# Patient Record
Sex: FEMALE | Race: WHITE | NOT HISPANIC OR LATINO | Employment: STUDENT | URBAN - METROPOLITAN AREA
[De-identification: names, ages, dates, MRNs, and addresses within clinical notes are randomized per-mention and may not be internally consistent; named-entity substitution may affect disease eponyms.]

---

## 2022-08-21 ENCOUNTER — HOSPITAL ENCOUNTER (EMERGENCY)
Facility: HOSPITAL | Age: 22
Discharge: HOME | End: 2022-08-21
Attending: EMERGENCY MEDICINE

## 2022-08-21 VITALS
TEMPERATURE: 98 F | HEART RATE: 77 BPM | RESPIRATION RATE: 16 BRPM | SYSTOLIC BLOOD PRESSURE: 129 MMHG | DIASTOLIC BLOOD PRESSURE: 86 MMHG | OXYGEN SATURATION: 97 %

## 2022-08-21 DIAGNOSIS — F10.920 ALCOHOLIC INTOXICATION WITHOUT COMPLICATION: Primary | ICD-10-CM

## 2022-08-21 PROCEDURE — 99282 PR EMERGENCY DEPT VISIT,LEVEL II: ICD-10-PCS | Mod: ,,, | Performed by: EMERGENCY MEDICINE

## 2022-08-21 PROCEDURE — 99283 EMERGENCY DEPT VISIT LOW MDM: CPT

## 2022-08-21 PROCEDURE — 99282 EMERGENCY DEPT VISIT SF MDM: CPT | Mod: ,,, | Performed by: EMERGENCY MEDICINE

## 2022-08-21 NOTE — ED NOTES
Pt. dc'd home dc information reviewed with Pt. Verbalized understanding Pt. Ambulated to lobby waiting on ride

## 2022-08-21 NOTE — ED PROVIDER NOTES
"Encounter Date: 8/21/2022       History     Chief Complaint   Patient presents with    Alcohol Intoxication     Patient brought in by EMS for evaluation of alcohol intoxication. Patient brought from [facility].      Michelle Langford is a 21 y.o. year old female with no significant PMH presenting to [facility] via EMS for evaluation of alcohol intoxication.     Per EMS, the patient was brought from her dorm at [facility] after they were called out of concern about her alcohol intoxication. Per patient, she was "thrown onto a stretcher for no reason" while standing on the street outside a bar called "[employer]". Patient asking for phone, but did not have it with her on arrival. She denies any headache, head injury, neck pain, back pain, chest pain, dyspnea, N/V/D, abdominal pain, dysuria, confusion, weakness, or sensory changes.         Review of patient's allergies indicates:  No Known Allergies  No past medical history on file.  No past surgical history on file.  No family history on file.     Review of Systems   Constitutional: Negative for chills and fever.   HENT: Negative for congestion and sore throat.    Eyes: Negative for pain and redness.   Respiratory: Negative for chest tightness and shortness of breath.    Cardiovascular: Negative for chest pain and leg swelling.   Gastrointestinal: Negative for abdominal distention, abdominal pain, diarrhea, nausea and vomiting.   Genitourinary: Negative for dysuria, flank pain and hematuria.   Musculoskeletal: Negative for back pain and neck pain.   Skin: Negative for color change and rash.   Neurological: Negative for dizziness, speech difficulty, light-headedness and headaches.   Psychiatric/Behavioral:        +etoh intoxication        Physical Exam     Initial Vitals [08/21/22 0156]   BP Pulse Resp Temp SpO2   129/86 77 16 98.2 °F (36.8 °C) 97 %      MAP       --         Physical Exam    Nursing note and vitals reviewed.  Constitutional: She appears well-developed " and well-nourished.   Patient smells of alcohol and answers questions loudly, but is easily redirectable and answers questions appropriately    HENT:   Head: Normocephalic.   Right Ear: External ear normal.   Left Ear: External ear normal.   Eyes: Conjunctivae are normal. Pupils are equal, round, and reactive to light. No scleral icterus.   Neck: Neck supple.   Cardiovascular: Normal rate and regular rhythm.   Pulmonary/Chest: Breath sounds normal. No stridor. No respiratory distress. She has no wheezes.   Abdominal: Abdomen is soft. There is no abdominal tenderness. There is no rebound and no guarding.   Musculoskeletal:         General: No edema.      Cervical back: Neck supple.     Neurological: She is alert and oriented to person, place, and time. GCS score is 15. GCS eye subscore is 4. GCS verbal subscore is 5. GCS motor subscore is 6.   Skin: Skin is warm and dry. Capillary refill takes less than 2 seconds. No rash noted.         ED Course   Procedures  Labs Reviewed   HIV 1 / 2 ANTIBODY   HEPATITIS C ANTIBODY          Imaging Results    None          Medications - No data to display  Medical Decision Making:   History:   I obtained history from: EMS provider.  ED Management:  Patient was brought to the ED via EMS after they were called for concern that the patient was overly intoxicated with alcohol. Patient was afebrile and hemodynamically stable upon arrival.     Patient seemed moderately intoxicated on initial assessment and confused about prior events in the evening, but improved with rest and food. Her father contacted the ED and spoke with Dr. Gonzales about the patient's assessment and plan. Patient later reassessed and clinical sobriety was achieved. Patient had no physical complaints. Patient is able to be picked up from the ED and taken to her dorm at [facility]. Patient has decisional capacity to leave and will be discharged home.             Attending Attestation:   Physician Attestation Statement for  Resident:  As the supervising MD   Physician Attestation Statement: I have personally seen and examined this patient.   I agree with the above history. -:   As the supervising MD I agree with the above PE.    As the supervising MD I agree with the above treatment, course, plan, and disposition.   -:   21-year-old female presenting to emergency department complaint of alcohol intoxication.  Patient states that she drink 2 shots this evening.  She does not know what prompted a friend or a bystander to call EMS.  She is afebrile, stable, nontoxic without external evidence of trauma.  She is ambulating independently with clear speech.  She is tolerating p.o..  She is able to articulate a plan for getting into her dorm room which involves her roommate being home, not intoxicated, and able to let her in at this time of morning.  She does not want to stay in the emergency department for further evaluation.  She is clinically sober, and I cannot keep her here against her will.  She declines further workup.  Discharged in stable condition.  [facility] police to bring her back to her dorm.  I have reviewed the following: old records at this facility.                         Clinical Impression:   Final diagnoses:  [F10.920] Alcoholic intoxication without complication (Primary)          ED Disposition Condition    Discharge Stable        ED Prescriptions     None        Follow-up Information     Follow up With Specialties Details Why Contact Info    Your primar care doctor  Schedule an appointment as soon as possible for a visit              Keren Mandujano MD  Resident  08/21/22 6303       Kim Gonzales MD  08/21/22 4331

## 2023-05-31 ENCOUNTER — OFFICE VISIT (OUTPATIENT)
Dept: URGENT CARE | Facility: CLINIC | Age: 23
End: 2023-05-31
Payer: COMMERCIAL

## 2023-05-31 VITALS
WEIGHT: 135 LBS | BODY MASS INDEX: 21.19 KG/M2 | TEMPERATURE: 98 F | SYSTOLIC BLOOD PRESSURE: 106 MMHG | RESPIRATION RATE: 16 BRPM | DIASTOLIC BLOOD PRESSURE: 61 MMHG | HEART RATE: 77 BPM | OXYGEN SATURATION: 97 % | HEIGHT: 67 IN

## 2023-05-31 DIAGNOSIS — E86.0 DEHYDRATION: ICD-10-CM

## 2023-05-31 DIAGNOSIS — R11.2 NAUSEA AND VOMITING, UNSPECIFIED VOMITING TYPE: Primary | ICD-10-CM

## 2023-05-31 PROBLEM — K58.9 IRRITABLE BOWEL SYNDROME: Status: ACTIVE | Noted: 2020-12-29

## 2023-05-31 PROBLEM — F41.1 GENERALIZED ANXIETY DISORDER: Status: ACTIVE | Noted: 2022-07-22

## 2023-05-31 PROBLEM — G89.29 CHRONIC BILATERAL LOW BACK PAIN WITHOUT SCIATICA: Status: ACTIVE | Noted: 2021-01-19

## 2023-05-31 PROBLEM — F41.9 ANXIETY: Status: ACTIVE | Noted: 2023-05-31

## 2023-05-31 PROBLEM — F32.9 MAJOR DEPRESSIVE DISORDER WITH CURRENT ACTIVE EPISODE: Status: ACTIVE | Noted: 2020-12-29

## 2023-05-31 PROBLEM — M54.50 CHRONIC BILATERAL LOW BACK PAIN WITHOUT SCIATICA: Status: ACTIVE | Noted: 2021-01-19

## 2023-05-31 PROCEDURE — S0119 ONDANSETRON 4 MG: HCPCS | Mod: S$GLB,,, | Performed by: NURSE PRACTITIONER

## 2023-05-31 PROCEDURE — S0119 PR ONDANSETRON, ORAL, 4MG: ICD-10-PCS | Mod: S$GLB,,, | Performed by: NURSE PRACTITIONER

## 2023-05-31 PROCEDURE — 99204 OFFICE O/P NEW MOD 45 MIN: CPT | Mod: S$GLB,,, | Performed by: NURSE PRACTITIONER

## 2023-05-31 PROCEDURE — 99204 PR OFFICE/OUTPT VISIT, NEW, LEVL IV, 45-59 MIN: ICD-10-PCS | Mod: S$GLB,,, | Performed by: NURSE PRACTITIONER

## 2023-05-31 RX ORDER — GABAPENTIN 300 MG/1
300 CAPSULE ORAL 3 TIMES DAILY
COMMUNITY
Start: 2023-03-31

## 2023-05-31 RX ORDER — ONDANSETRON 4 MG/1
4 TABLET, ORALLY DISINTEGRATING ORAL
Status: COMPLETED | OUTPATIENT
Start: 2023-05-31 | End: 2023-05-31

## 2023-05-31 RX ORDER — ONDANSETRON 4 MG/1
4 TABLET, ORALLY DISINTEGRATING ORAL 2 TIMES DAILY PRN
Qty: 4 TABLET | Refills: 0 | Status: SHIPPED | OUTPATIENT
Start: 2023-05-31 | End: 2023-06-02

## 2023-05-31 RX ORDER — FLUOXETINE 20 MG/1
TABLET ORAL
COMMUNITY
Start: 2022-07-22

## 2023-05-31 RX ORDER — SODIUM CHLORIDE 9 MG/ML
INJECTION, SOLUTION INTRAVENOUS
Status: COMPLETED | OUTPATIENT
Start: 2023-05-31 | End: 2023-05-31

## 2023-05-31 RX ADMIN — ONDANSETRON 4 MG: 4 TABLET, ORALLY DISINTEGRATING ORAL at 05:05

## 2023-05-31 RX ADMIN — SODIUM CHLORIDE: 9 INJECTION, SOLUTION INTRAVENOUS at 05:05

## 2023-05-31 NOTE — PROGRESS NOTES
"Subjective:      Patient ID: Michelle Langford is a 22 y.o. female.    Vitals:  height is 5' 7" (1.702 m) and weight is 61.2 kg (135 lb). Her temperature is 98.2 °F (36.8 °C). Her blood pressure is 106/61 and her pulse is 77. Her respiration is 16 and oxygen saturation is 97%.     Chief Complaint: Emesis    22 y.o female presents today c/o nauseas and emesis that started 3 hours ago and [place] been vomiting every 20-30 minutes since then.  Patient also feels like passing out.       22-year-old returned from [place] last night after visiting for 2 weeks, consumed eggs and toast this morning and approx. 4 hours later she had episodes of vomiting and nausea.     Emesis   This is a new problem. The current episode started today. The problem occurs 5 to 10 times per day. The emesis has an appearance of stomach contents. There has been no fever. Associated symptoms include abdominal pain, chills and headaches. Pertinent negatives include no arthralgias, chest pain, coughing, decreased urine volume, diarrhea, dizziness, fever, myalgias, sweats, URI or weight loss. She has tried nothing for the symptoms.     Constitution: Positive for chills, fatigue and generalized weakness. Negative for fever.   Cardiovascular:  Negative for chest pain.   Respiratory:  Negative for cough.    Gastrointestinal:  Positive for abdominal pain and vomiting. Negative for diarrhea.   Genitourinary:  Negative for urine decreased.   Musculoskeletal:  Negative for joint pain and muscle ache.   Neurological:  Positive for headaches. Negative for dizziness.    Objective:     Physical Exam   Constitutional: She is oriented to person, place, and time. She appears well-developed. She is cooperative. She appears ill. awake  HENT:   Head: Normocephalic and atraumatic.   Ears:   Right Ear: External ear normal.   Left Ear: External ear normal.   Nose: Nose normal.   Mouth/Throat: Mucous membranes are dry.   Eyes: Conjunctivae and lids are normal.   Neck: Trachea " normal. Neck supple.   Cardiovascular: Normal rate, regular rhythm and normal heart sounds.   Pulmonary/Chest: Effort normal and breath sounds normal. No respiratory distress.   Abdominal: Bowel sounds are normal. She exhibits no distension and no mass. Soft. There is no abdominal tenderness.   Musculoskeletal: Normal range of motion.         General: Normal range of motion.   Neurological: She is alert and oriented to person, place, and time. She has normal strength.   Skin: Skin is warm, dry, intact, not diaphoretic and not pale.   Psychiatric: Her speech is normal and behavior is normal. Judgment and thought content normal.   Nursing note and vitals reviewed.    Patient reports feeling significantly better after zofran and IVF's    Assessment:     1. Nausea and vomiting, unspecified vomiting type    2. Dehydration        Plan:       Nausea and vomiting, unspecified vomiting type  -     0.9%  NaCl infusion  -     ondansetron disintegrating tablet 4 mg  -     ondansetron (ZOFRAN-ODT) 4 MG TbDL; Take 1 tablet (4 mg total) by mouth 2 (two) times daily as needed (nausea).  Dispense: 4 tablet; Refill: 0    Dehydration  -     0.9%  NaCl infusion      22 jelco inserted via left hand x 1 attempt, IVF's 1L NS infused over one hour,cathter d/c'd tip intact, dressing and band-aid applied, site without infiltration.             Patient Instructions   Gastroenteritis  If your condition worsens or fails to improve we recommend that you receive another evaluation at the ER immediately or contact your PCP to discuss your concerns or return here. You must understand that you've received an urgent care treatment only and that you may be released before all your medical problems are known or treated. You the patient will arrange for followup care as instructed.   If you have diarrhea you can use Pepto Bismol.   Increase fluids and rest is important   Start off with liquid diet and progress as tolerated (see below)  Water and clear  liquids are important so you do not get dehydrated. Drink a small amount at a time.  Do not force yourself to eat, especially if you have cramps, vomiting, or diarrhea. When you finally decide to start eating, do not eat large amounts at a time, even if you are hungry.  If you eat, avoid fatty, greasy, spicy, or fried foods.  Do not eat dairy products if you have diarrhea; they can make the diarrhea worse  Watch for any increase pain, fever, localized pain to right lower abdomen or continued vomiting or diarrhea.

## 2024-10-21 ENCOUNTER — OFFICE VISIT (OUTPATIENT)
Dept: URGENT CARE | Facility: CLINIC | Age: 24
End: 2024-10-21
Payer: COMMERCIAL

## 2024-10-21 VITALS
HEIGHT: 67 IN | SYSTOLIC BLOOD PRESSURE: 121 MMHG | HEART RATE: 65 BPM | TEMPERATURE: 98 F | DIASTOLIC BLOOD PRESSURE: 73 MMHG | BODY MASS INDEX: 21.19 KG/M2 | WEIGHT: 135 LBS | RESPIRATION RATE: 18 BRPM | OXYGEN SATURATION: 99 %

## 2024-10-21 DIAGNOSIS — M79.644 PAIN OF RIGHT MIDDLE FINGER: ICD-10-CM

## 2024-10-21 DIAGNOSIS — S60.10XA SUBUNGUAL HEMATOMA OF FINGER OF RIGHT HAND, INITIAL ENCOUNTER: ICD-10-CM

## 2024-10-21 DIAGNOSIS — L03.011 PARONYCHIA OF RIGHT MIDDLE FINGER: ICD-10-CM

## 2024-10-21 DIAGNOSIS — S67.192A CRUSHING INJURY OF RIGHT MIDDLE FINGER, INITIAL ENCOUNTER: Primary | ICD-10-CM

## 2024-10-21 DIAGNOSIS — M79.89 SWELLING OF RIGHT MIDDLE FINGER: ICD-10-CM

## 2024-10-21 PROBLEM — G95.9 CERVICAL MYELOPATHY: Chronic | Status: ACTIVE | Noted: 2023-07-28

## 2024-10-21 PROBLEM — M54.2 ACUTE NECK PAIN: Status: ACTIVE | Noted: 2024-10-21

## 2024-10-21 PROCEDURE — 73140 X-RAY EXAM OF FINGER(S): CPT | Mod: RT,S$GLB,, | Performed by: RADIOLOGY

## 2024-10-21 PROCEDURE — 99214 OFFICE O/P EST MOD 30 MIN: CPT | Mod: 25,S$GLB,, | Performed by: NURSE PRACTITIONER

## 2024-10-21 PROCEDURE — 96372 THER/PROPH/DIAG INJ SC/IM: CPT | Mod: S$GLB,,, | Performed by: NURSE PRACTITIONER

## 2024-10-21 RX ORDER — LEVONORGESTREL 52 MG/1
1 INTRAUTERINE DEVICE INTRAUTERINE
COMMUNITY

## 2024-10-21 RX ORDER — ARIPIPRAZOLE 5 MG/1
TABLET ORAL
COMMUNITY
Start: 2024-09-27

## 2024-10-21 RX ORDER — AMOXICILLIN AND CLAVULANATE POTASSIUM 875; 125 MG/1; MG/1
1 TABLET, FILM COATED ORAL EVERY 12 HOURS
Qty: 14 TABLET | Refills: 0 | Status: SHIPPED | OUTPATIENT
Start: 2024-10-21 | End: 2024-10-28

## 2024-10-21 RX ORDER — IBUPROFEN 600 MG/1
600 TABLET ORAL 2 TIMES DAILY PRN
Qty: 10 TABLET | Refills: 0 | Status: SHIPPED | OUTPATIENT
Start: 2024-10-21 | End: 2024-10-26

## 2024-10-21 RX ORDER — KETOROLAC TROMETHAMINE 30 MG/ML
30 INJECTION, SOLUTION INTRAMUSCULAR; INTRAVENOUS
Status: COMPLETED | OUTPATIENT
Start: 2024-10-21 | End: 2024-10-21

## 2024-10-21 RX ORDER — MUPIROCIN 20 MG/G
OINTMENT TOPICAL 3 TIMES DAILY PRN
Qty: 22 G | Refills: 0 | Status: SHIPPED | OUTPATIENT
Start: 2024-10-21

## 2024-10-21 RX ADMIN — KETOROLAC TROMETHAMINE 30 MG: 30 INJECTION, SOLUTION INTRAMUSCULAR; INTRAVENOUS at 05:10

## 2024-10-21 NOTE — PATIENT INSTRUCTIONS
After 48 hours, most subungual hematomas have clotted, and trephination is typically not effective.    Please drink plenty of fluids.  Please get plenty of rest.  Please return here or go to the Emergency Department for any concerns or worsening of condition.  You were prescribed an anti-inflammatory medication. Do not take these medications on an empty stomach.  Rest, ice, compression and elevation to the affected joint or limb as needed.  Please follow up with your primary care doctor or specialist as needed.    If you  smoke, please stop smoking.

## 2024-10-21 NOTE — PROGRESS NOTES
"Subjective:      Patient ID: Michelle Langford is a 23 y.o. female.    Vitals:  height is 5' 7" (1.702 m) and weight is 61.2 kg (135 lb). Her oral temperature is 98 °F (36.7 °C). Her blood pressure is 121/73 and her pulse is 65. Her respiration is 18 and oxygen saturation is 99%.     Chief Complaint: Hand Pain (/)    Patient presents with c.o rt 3rd digit pain. Pain secondary to a smashing injury. Patient was at a football game and states her hand was smashed int he car door. Patient with significant bruising to the distal aspect of her finger. No bleeding occurred externally.   Last tdap 2019.    23 year old female presents to clinic with reports of injury to middle finger right hand three days ago after slamming her finger in a car door, pain rated 5-6 on a scale 0-10.     Hand Pain   Her dominant hand is their right hand. Incident onset: 2 days pta. Injury mechanism: smashing injury. Pain location: rt 3rd digit. The quality of the pain is described as aching. The pain does not radiate. Pertinent negatives include no numbness. The symptoms are aggravated by palpation and movement. She has tried nothing for the symptoms.       Musculoskeletal:  Positive for pain, trauma, joint pain, joint swelling, abnormal ROM of joint and muscle ache.   Skin:  Positive for erythema and bruising.   Neurological:  Negative for numbness and tingling.      Objective:     Physical Exam   Constitutional: She is oriented to person, place, and time. She appears well-developed.   HENT:   Head: Normocephalic and atraumatic. Head is without abrasion, without contusion and without laceration.   Ears:   Right Ear: External ear normal.   Left Ear: External ear normal.   Nose: Nose normal.   Mouth/Throat: Oropharynx is clear and moist and mucous membranes are normal.   Eyes: EOM and lids are normal. Extraocular movement intact   Neck: Trachea normal and phonation normal. Neck supple.   Cardiovascular: Normal rate.   Pulmonary/Chest: Effort normal. " No stridor. No respiratory distress.   Musculoskeletal:      Right hand: Right middle finger: Exhibits deformity, swelling, tenderness and ecchymosis. Injuries: puncture wound.   Neurological: She is alert and oriented to person, place, and time.   Skin: Skin is warm, dry, intact and no rash. Capillary refill takes less than 2 seconds. erythema No abrasion, No burn, No bruising and No ecchymosis   Psychiatric: Her speech is normal and behavior is normal. Judgment and thought content normal.   Nursing note and vitals reviewed.      Assessment:     1. Crushing injury of right middle finger, initial encounter    2. Pain of right middle finger    3. Swelling of right middle finger    4. Subungual hematoma of finger of right hand, initial encounter    5. Paronychia of right middle finger        Plan:       Crushing injury of right middle finger, initial encounter  -     X-Ray Finger 2 or More Views Right; Future; Expected date: 10/21/2024    Pain of right middle finger  -     ketorolac injection 30 mg  -     SPLINT FOR HOME USE  -     ibuprofen (ADVIL,MOTRIN) 600 MG tablet; Take 1 tablet (600 mg total) by mouth 2 (two) times daily as needed for Pain.  Dispense: 10 tablet; Refill: 0    Swelling of right middle finger  -     ketorolac injection 30 mg  -     SPLINT FOR HOME USE  -     ibuprofen (ADVIL,MOTRIN) 600 MG tablet; Take 1 tablet (600 mg total) by mouth 2 (two) times daily as needed for Pain.  Dispense: 10 tablet; Refill: 0    Subungual hematoma of finger of right hand, initial encounter    Paronychia of right middle finger  -     amoxicillin-clavulanate 875-125mg (AUGMENTIN) 875-125 mg per tablet; Take 1 tablet by mouth every 12 (twelve) hours. for 7 days  Dispense: 14 tablet; Refill: 0  -     mupirocin (BACTROBAN) 2 % ointment; Apply topically 3 (three) times daily as needed.  Dispense: 22 g; Refill: 0        X-Ray Finger 2 or More Views Right    Result Date: 10/21/2024  EXAMINATION: XR FINGER 2 OR MORE VIEWS  RIGHT CLINICAL HISTORY: Injury, unspecified, initial encounter TECHNIQUE: PA and lateral radiographs of the right long finger. COMPARISON: None FINDINGS: There is no acute fracture or dislocation.  Alignment is normal.  Joint spaces are preserved.  There is soft tissue edema of the dorsal distal long finger.  There is no radiopaque foreign body in the field of view.     No acute fracture or dislocation. Soft tissue edema of the dorsal distal finger. Electronically signed by: Andrae Gonsales Date:    10/21/2024 Time:    17:29      Reviewed xray with patient who acknowledged results. Discussed  Diagnosis and treatment plan with patient who verbalized understanding and agrees with plan of care.   Patient given educational handouts supporting this diagnosis as well.  Finger splint applied, patient tolerated well.  Patient denies any further questions or concerns at this time.  Patient exits exam room in no acute distress.   Patient Instructions   After 48 hours, most subungual hematomas have clotted, and trephination is typically not effective.    Please drink plenty of fluids.  Please get plenty of rest.  Please return here or go to the Emergency Department for any concerns or worsening of condition.  You were prescribed an anti-inflammatory medication. Do not take these medications on an empty stomach.  Rest, ice, compression and elevation to the affected joint or limb as needed.  Please follow up with your primary care doctor or specialist as needed.    If you  smoke, please stop smoking.